# Patient Record
Sex: FEMALE | URBAN - METROPOLITAN AREA
[De-identification: names, ages, dates, MRNs, and addresses within clinical notes are randomized per-mention and may not be internally consistent; named-entity substitution may affect disease eponyms.]

---

## 2020-11-11 ENCOUNTER — TRANSFERRED RECORDS (OUTPATIENT)
Dept: HEALTH INFORMATION MANAGEMENT | Facility: CLINIC | Age: 25
End: 2020-11-11

## 2020-11-17 ENCOUNTER — APPOINTMENT (OUTPATIENT)
Dept: INTERPRETER SERVICES | Facility: CLINIC | Age: 25
End: 2020-11-17
Payer: MEDICAID

## 2020-12-28 ENCOUNTER — TELEPHONE (OUTPATIENT)
Dept: OBGYN | Facility: CLINIC | Age: 25
End: 2020-12-28

## 2020-12-28 NOTE — TELEPHONE ENCOUNTER
Routed message to  to reach out to patient to schedule for colposcopy spring 2021 following delivery.

## 2020-12-28 NOTE — TELEPHONE ENCOUNTER
----- Message from Dianna Acharya MD sent at 12/8/2020 12:20 PM CST -----  Regarding: RE: LSIL Pap in pregnancy  Yes. Can we go ahead and schedule it so we don't lose her??  ----- Message -----  From: Dorothy Chapman RN  Sent: 12/3/2020   4:40 PM CST  To: Dianna Acharya MD  Subject: RE: LSIL Pap in pregnancy                        Sorry this fell off my radar. I had to do a bit of digging but it looks like she is 27w0d today. Should we go with colpo and Pap postpartum?     JAMI Winslow   ----- Message -----  From: Dianna Acharya MD  Sent: 12/3/2020   1:22 PM CST  To: s [facility]  Subject: FW: LSIL Pap in pregnancy                        Follow up:  I was never able to get a hold of this patient from [facility]    1. If she's third trimester we will do pap and colpo post partum  2. If she's earlier we can schedule colpo (no biopsy) with any doc or resident.     Should we send a letter?    Dianna  ----- Message -----  From: Dorothy Chapman RN  Sent: 11/19/2020   8:31 AM CST  To: Dianna Acharya MD  Subject: LSIL Pap in pregnancy                            This is the patient with the LSIL pap in pregnancy. I was moving too fast when I sent you the incorrect patient on Tuesday. Sorry about that. I have the records here on my desk. JAMI Winslow

## 2020-12-30 ENCOUNTER — APPOINTMENT (OUTPATIENT)
Dept: INTERPRETER SERVICES | Facility: CLINIC | Age: 25
End: 2020-12-30
Payer: MEDICAID

## 2024-06-18 ENCOUNTER — TRANSCRIBE ORDERS (OUTPATIENT)
Dept: OTHER | Age: 29
End: 2024-06-18

## 2024-06-18 DIAGNOSIS — L70.0 ACNE VULGARIS: ICD-10-CM

## 2024-06-18 DIAGNOSIS — L81.2 FRECKLING, AXILLARY/INGUINAL: Primary | ICD-10-CM

## 2024-06-18 DIAGNOSIS — L81.1 MELASMA: ICD-10-CM
